# Patient Record
Sex: MALE | Race: WHITE | NOT HISPANIC OR LATINO
[De-identification: names, ages, dates, MRNs, and addresses within clinical notes are randomized per-mention and may not be internally consistent; named-entity substitution may affect disease eponyms.]

---

## 2017-03-10 ENCOUNTER — TRANSCRIPTION ENCOUNTER (OUTPATIENT)
Age: 57
End: 2017-03-10

## 2019-12-16 ENCOUNTER — APPOINTMENT (OUTPATIENT)
Dept: OTOLARYNGOLOGY | Facility: CLINIC | Age: 59
End: 2019-12-16

## 2020-02-22 ENCOUNTER — APPOINTMENT (OUTPATIENT)
Dept: OTOLARYNGOLOGY | Facility: CLINIC | Age: 60
End: 2020-02-22
Payer: COMMERCIAL

## 2020-02-22 VITALS
SYSTOLIC BLOOD PRESSURE: 127 MMHG | HEIGHT: 68 IN | DIASTOLIC BLOOD PRESSURE: 82 MMHG | BODY MASS INDEX: 31.67 KG/M2 | HEART RATE: 90 BPM | WEIGHT: 209 LBS

## 2020-02-22 DIAGNOSIS — R42 DIZZINESS AND GIDDINESS: ICD-10-CM

## 2020-02-22 PROCEDURE — 99203 OFFICE O/P NEW LOW 30 MIN: CPT

## 2020-02-22 RX ORDER — MELOXICAM 15 MG/1
15 TABLET ORAL
Qty: 20 | Refills: 0 | Status: DISCONTINUED | COMMUNITY
Start: 2020-01-10

## 2020-02-22 NOTE — REVIEW OF SYSTEMS
[Post Nasal Drip] : post nasal drip [Vertigo] : vertigo [Problem Snoring] : problem snoring [Negative] : Heme/Lymph

## 2020-02-25 NOTE — REASON FOR VISIT
[Initial Consultation] : an initial consultation for [FreeTextEntry2] : referred by Physical therapist to follow up for right ear vestibular

## 2020-02-25 NOTE — HISTORY OF PRESENT ILLNESS
[de-identified] : several episodes of vertigo\par may be positional\par no changes in hearing\par no otologic symptoms

## 2020-02-25 NOTE — PHYSICAL EXAM
[Hearing Loss Left Only] : normal [Hearing Loss Right Only] : normal [Nystagmus] : ~T no ~M nystagmus was seen [Fukuda Step Test] : Fukuda Step Test was Positive [Fistula Sign] : Fistula Sign: Negative [Romberg's Sign] : Romberg's sign was absent [Past-Pointing] : Past-Pointing: Negative [[place]-Hallrosake] : [place]-Hallpike: Negative [Midline] : trachea located in midline position [Normal] : orientation to person, place, and time: normal

## 2020-03-11 ENCOUNTER — APPOINTMENT (OUTPATIENT)
Dept: OTOLARYNGOLOGY | Facility: CLINIC | Age: 60
End: 2020-03-11
Payer: COMMERCIAL

## 2020-03-11 PROCEDURE — 92700A: CUSTOM

## 2020-03-11 PROCEDURE — 92537 CALORIC VSTBLR TEST W/REC: CPT

## 2020-03-11 PROCEDURE — 92557 COMPREHENSIVE HEARING TEST: CPT

## 2020-03-11 PROCEDURE — 92540 BASIC VESTIBULAR EVALUATION: CPT

## 2020-03-11 PROCEDURE — 92567 TYMPANOMETRY: CPT

## 2020-03-11 PROCEDURE — 92547 SUPPLEMENTAL ELECTRICAL TEST: CPT

## 2020-03-29 ENCOUNTER — TRANSCRIPTION ENCOUNTER (OUTPATIENT)
Age: 60
End: 2020-03-29

## 2020-03-31 DIAGNOSIS — H81.92 UNSPECIFIED DISORDER OF VESTIBULAR FUNCTION, LEFT EAR: ICD-10-CM

## 2021-02-15 ENCOUNTER — APPOINTMENT (OUTPATIENT)
Dept: DISASTER EMERGENCY | Facility: CLINIC | Age: 61
End: 2021-02-15

## 2021-12-05 ENCOUNTER — TRANSCRIPTION ENCOUNTER (OUTPATIENT)
Age: 61
End: 2021-12-05

## 2025-01-19 ENCOUNTER — NON-APPOINTMENT (OUTPATIENT)
Age: 65
End: 2025-01-19

## 2025-02-23 ENCOUNTER — NON-APPOINTMENT (OUTPATIENT)
Age: 65
End: 2025-02-23